# Patient Record
Sex: FEMALE | Race: ASIAN | URBAN - METROPOLITAN AREA
[De-identification: names, ages, dates, MRNs, and addresses within clinical notes are randomized per-mention and may not be internally consistent; named-entity substitution may affect disease eponyms.]

---

## 2018-11-28 ENCOUNTER — EMERGENCY (EMERGENCY)
Facility: HOSPITAL | Age: 43
LOS: 0 days | Discharge: HOME | End: 2018-11-28
Attending: EMERGENCY MEDICINE | Admitting: EMERGENCY MEDICINE

## 2018-11-28 VITALS
RESPIRATION RATE: 20 BRPM | HEIGHT: 66.93 IN | DIASTOLIC BLOOD PRESSURE: 95 MMHG | OXYGEN SATURATION: 97 % | WEIGHT: 139.99 LBS | HEART RATE: 79 BPM | SYSTOLIC BLOOD PRESSURE: 131 MMHG | TEMPERATURE: 98 F
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
## 2018-11-28 DIAGNOSIS — H57.89 OTHER SPECIFIED DISORDERS OF EYE AND ADNEXA: ICD-10-CM

## 2018-11-28 DIAGNOSIS — Z23 ENCOUNTER FOR IMMUNIZATION: ICD-10-CM
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
## 2018-11-28 DIAGNOSIS — S05.02XA INJURY OF CONJUNCTIVA AND CORNEAL ABRASION WITHOUT FOREIGN BODY, LEFT EYE, INITIAL ENCOUNTER: ICD-10-CM
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
## 2018-11-28 RX ORDER — CIPROFLOXACIN HCL 0.3 %
1 DROPS OPHTHALMIC (EYE) ONCE
Qty: 0 | Refills: 0 | Status: COMPLETED | OUTPATIENT
Start: 2018-11-28 | End: 2018-11-28

## 2018-11-28 RX ORDER — TETANUS TOXOID, REDUCED DIPHTHERIA TOXOID AND ACELLULAR PERTUSSIS VACCINE, ADSORBED 5; 2.5; 8; 8; 2.5 [IU]/.5ML; [IU]/.5ML; UG/.5ML; UG/.5ML; UG/.5ML
0.5 SUSPENSION INTRAMUSCULAR ONCE
Qty: 0 | Refills: 0 | Status: COMPLETED | OUTPATIENT
Start: 2018-11-28 | End: 2018-11-28

## 2018-11-28 RX ADMIN — Medication 1 DROP(S): at 21:51

## 2018-11-28 RX ADMIN — TETANUS TOXOID, REDUCED DIPHTHERIA TOXOID AND ACELLULAR PERTUSSIS VACCINE, ADSORBED 0.5 MILLILITER(S): 5; 2.5; 8; 8; 2.5 SUSPENSION INTRAMUSCULAR at 21:52

## 2018-11-28 NOTE — ED PROVIDER NOTE - PHYSICAL EXAMINATION
Physical Exam    Vital Signs: I have reviewed the initial vital signs.  Constitutional: well-nourished, appears stated age, no acute distress  Eyes: Conjunctiva pink, Sclera clear, PERRLA, EOMI. neg sidel + dye uptake to sclear and cornea. vision 20/30LT, 20/30RT, 20/30 both  Integumentary: warm, dry, no rash  Neurologic: awake, alert, cranial nerves II-XII grossly intact, extremities’ motor and sensory functions grossly intact  Psychiatric: appropriate mood, appropriate affect

## 2018-11-28 NOTE — ED ADULT NURSE NOTE - NSIMPLEMENTINTERV_GEN_ALL_ED
Implemented All Universal Safety Interventions:  [place] to call system. Call bell, personal items and telephone within reach. Instruct patient to call for assistance. Room bathroom lighting operational. Non-slip footwear when patient is off stretcher. Physically safe environment: no spills, clutter or unnecessary equipment. Stretcher in lowest position, wheels locked, appropriate side rails in place.

## 2018-11-28 NOTE — ED PROVIDER NOTE - CARE PLAN
Principal Discharge DX:	Corneal abrasion  Goal:	Sclera abrasion Principal Discharge DX:	Corneal abrasion  Goal:	Sclera abrasion  Assessment and plan of treatment:	Plan: Pain control, Flourescein application, Eye drops, reassess.

## 2018-11-28 NOTE — ED PROVIDER NOTE - NS ED ROS FT
Constitutional: (-) fever  Eyes/ENT: (-) blurry vision, (-) epistaxis  Musculoskeletal: (-) neck pain, (-) back pain, (-) joint pain  Integumentary: (-) rash, (-) edema  Neurological: (-) headache, (-) altered mental status

## 2018-11-28 NOTE — ED PROVIDER NOTE - NSFOLLOWUPINSTRUCTIONS_ED_ALL_ED_FT
Follow up with eye doctors in 1-2 days. Place 1 drop in your injured eye every 4 hours while awake for the next 5 days.

## 2018-11-28 NOTE — ED PROVIDER NOTE - NSFOLLOWUPCLINICS_GEN_ALL_ED_FT
[facility]  Ophthalmolgy  [address], [address]  [place] [address]  Phone: [phone]  Fax:   Follow Up Time: 1-3 Days

## 2018-11-28 NOTE — ED PROVIDER NOTE - OBJECTIVE STATEMENT
42 year old female states that an xmas ornament fell into her eye. patient states that since it happened she has been in pain. patient denies trouble seeing, no tearing no discharge. no headache no dizziness.

## 2018-11-28 NOTE — ED PROVIDER NOTE - MEDICAL DECISION MAKING DETAILS
pt and son at bedside aware of corneal abrasions, eye drops given in ed, aware of proper use, signs and symptoms to return for, will follow up with opthalmology as well.

## 2018-11-28 NOTE — ED PROVIDER NOTE - CARE PROVIDER_API CALL
Glynn Hernandez (MD), Ophthalmology  [address] [place] [address]  Phone: [phone]  Fax: [phone]
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
## 2018-11-29 ENCOUNTER — OUTPATIENT (OUTPATIENT)
Dept: OUTPATIENT SERVICES | Facility: HOSPITAL | Age: 43
LOS: 1 days | Discharge: HOME | End: 2018-11-29

## 2018-12-01 PROBLEM — Z00.00 ENCOUNTER FOR PREVENTIVE HEALTH EXAMINATION: Status: ACTIVE | Noted: 2018-12-01

## 2018-12-04 DIAGNOSIS — H11.32 CONJUNCTIVAL HEMORRHAGE, LEFT EYE: ICD-10-CM

## 2018-12-04 DIAGNOSIS — H10.32 UNSPECIFIED ACUTE CONJUNCTIVITIS, LEFT EYE: ICD-10-CM

## 2018-12-04 DIAGNOSIS — H10.45 OTHER CHRONIC ALLERGIC CONJUNCTIVITIS: ICD-10-CM

## 2019-05-11 ENCOUNTER — EMERGENCY (EMERGENCY)
Facility: HOSPITAL | Age: 44
LOS: 0 days | Discharge: HOME | End: 2019-05-11
Attending: EMERGENCY MEDICINE | Admitting: EMERGENCY MEDICINE
Payer: MEDICAID

## 2019-05-11 VITALS
SYSTOLIC BLOOD PRESSURE: 137 MMHG | OXYGEN SATURATION: 99 % | TEMPERATURE: 98 F | HEART RATE: 95 BPM | DIASTOLIC BLOOD PRESSURE: 96 MMHG | RESPIRATION RATE: 18 BRPM

## 2019-05-11 DIAGNOSIS — M79.646 PAIN IN UNSPECIFIED FINGER(S): ICD-10-CM

## 2019-05-11 DIAGNOSIS — S62.664A NONDISPLACED FRACTURE OF DISTAL PHALANX OF RIGHT RING FINGER, INITIAL ENCOUNTER FOR CLOSED FRACTURE: ICD-10-CM
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
## 2019-05-11 DIAGNOSIS — S60.141A CONTUSION OF RIGHT RING FINGER WITH DAMAGE TO NAIL, INITIAL ENCOUNTER: ICD-10-CM
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
## 2019-05-11 PROCEDURE — 26011 DRAINAGE OF FINGER ABSCESS: CPT

## 2019-05-11 PROCEDURE — 26750 TREAT FINGER FRACTURE EACH: CPT | Mod: 54

## 2019-05-11 PROCEDURE — 99284 EMERGENCY DEPT VISIT MOD MDM: CPT | Mod: 25

## 2019-05-11 PROCEDURE — 73130 X-RAY EXAM OF HAND: CPT | Mod: 26,RT

## 2019-05-11 RX ORDER — TETANUS TOXOID, REDUCED DIPHTHERIA TOXOID AND ACELLULAR PERTUSSIS VACCINE, ADSORBED 5; 2.5; 8; 8; 2.5 [IU]/.5ML; [IU]/.5ML; UG/.5ML; UG/.5ML; UG/.5ML
0.5 SUSPENSION INTRAMUSCULAR ONCE
Refills: 0 | Status: COMPLETED | OUTPATIENT
Start: 2019-05-11 | End: 2019-05-11

## 2019-05-11 NOTE — ED ADULT TRIAGE NOTE - CHIEF COMPLAINT QUOTE
pts son translating pt states she got right ring finger stuck in garage door. pt has bleeding and swelling to finger.

## 2019-05-11 NOTE — ED PROCEDURE NOTE - CPROC ED POST PROC CARE GUIDE1
Keep the cast/splint/dressing clean and dry./Verbal/written post procedure instructions were given to patient/caregiver./Instructed patient/caregiver to follow-up with primary care physician.
Instructed patient/caregiver regarding signs and symptoms of infection./Verbal/written post procedure instructions were given to patient/caregiver./Instructed patient/caregiver to follow-up with primary care physician.

## 2019-05-11 NOTE — ED PROVIDER NOTE - ATTENDING CONTRIBUTION TO CARE
42 yo F presents with c/o pain and swelling to right ring finger after catching it in the door. On exam pt in NAD AAO x 3, + swelling with tenderness to right ring finger with subungual hematoma, FROM, + abrasion as well,

## 2019-05-11 NOTE — ED PROVIDER NOTE - PHYSICAL EXAMINATION
Physical Exam    Vital Signs: I have reviewed the initial vital signs.  Constitutional: well-nourished, appears stated age, no acute distress  Cardiovascular: regular rate, regular rhythm, well-perfused extremities, radial pulse +2 b/l, cap refill <2 sec b/l.  Musculoskeletal: +TTP over the distal 4th phalanx digit, full ROM in all other joints of the hand  Integumentary: +puncture R puncture 4th digit volar surface w/subungual hematoma  Neurologic: extremities’ motor and sensory functions grossly intact

## 2019-05-11 NOTE — ED PROVIDER NOTE - CARE PLAN
Principal Discharge DX:	Closed nondisplaced fracture of distal phalanx of ring finger, unspecified laterality, initial encounter

## 2019-05-11 NOTE — ED PROVIDER NOTE - CARE PROVIDER_API CALL
Zaki Mena)  Orthopaedic Surgery  [address] [place] [address]  Phone: [phone]  Fax: [phone]  Follow Up Time:
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
## 2019-05-11 NOTE — ED PROVIDER NOTE - NS ED ROS FT
Review of Systems    Constitutional: (-) fever  Musculoskeletal: (-) neck pain, (-) back pain, (+) joint pain  Integumentary: (-) laceration  Heme/Lymph: (-) easy bruising (-) easy bleeding  Allergic/Immunologic: (-) pruritus

## 2019-05-11 NOTE — ED PROVIDER NOTE - PRINCIPAL DIAGNOSIS
Closed nondisplaced fracture of distal phalanx of ring finger, unspecified laterality, initial encounter

## 2024-05-01 ENCOUNTER — EMERGENCY (EMERGENCY)
Facility: HOSPITAL | Age: 49
LOS: 0 days | Discharge: HOME | End: 2024-05-01
Attending: EMERGENCY MEDICINE
Payer: MEDICAID

## 2024-05-01 VITALS
SYSTOLIC BLOOD PRESSURE: 165 MMHG | RESPIRATION RATE: 20 BRPM | DIASTOLIC BLOOD PRESSURE: 97 MMHG | OXYGEN SATURATION: 96 % | TEMPERATURE: 98 F | WEIGHT: 160.06 LBS | HEART RATE: 88 BPM

## 2024-05-01 DIAGNOSIS — J02.9 ACUTE PHARYNGITIS, UNSPECIFIED: ICD-10-CM

## 2024-05-01 DIAGNOSIS — T71.193A ASPHYXIATION DUE TO MECHANICAL THREAT TO BREATHING DUE TO OTHER CAUSES, ASSAULT, INITIAL ENCOUNTER: ICD-10-CM

## 2024-05-01 DIAGNOSIS — M54.2 CERVICALGIA: ICD-10-CM

## 2024-05-01 DIAGNOSIS — R09.89 OTHER SPECIFIED SYMPTOMS AND SIGNS INVOLVING THE CIRCULATORY AND RESPIRATORY SYSTEMS: ICD-10-CM

## 2024-05-01 DIAGNOSIS — R09.81 NASAL CONGESTION: ICD-10-CM

## 2024-05-01 DIAGNOSIS — R59.0 LOCALIZED ENLARGED LYMPH NODES: ICD-10-CM

## 2024-05-01 LAB
ALBUMIN SERPL ELPH-MCNC: 4.4 G/DL — SIGNIFICANT CHANGE UP (ref 3.5–5.2)
ALP SERPL-CCNC: 93 U/L — SIGNIFICANT CHANGE UP (ref 30–115)
ALT FLD-CCNC: 32 U/L — SIGNIFICANT CHANGE UP (ref 0–41)
ANION GAP SERPL CALC-SCNC: 11 MMOL/L — SIGNIFICANT CHANGE UP (ref 7–14)
AST SERPL-CCNC: 28 U/L — SIGNIFICANT CHANGE UP (ref 0–41)
BILIRUB SERPL-MCNC: <0.2 MG/DL — SIGNIFICANT CHANGE UP (ref 0.2–1.2)
BUN SERPL-MCNC: 16 MG/DL — SIGNIFICANT CHANGE UP (ref 10–20)
CALCIUM SERPL-MCNC: 8.8 MG/DL — SIGNIFICANT CHANGE UP (ref 8.4–10.5)
CHLORIDE SERPL-SCNC: 105 MMOL/L — SIGNIFICANT CHANGE UP (ref 98–110)
CO2 SERPL-SCNC: 24 MMOL/L — SIGNIFICANT CHANGE UP (ref 17–32)
CREAT SERPL-MCNC: 0.6 MG/DL — LOW (ref 0.7–1.5)
EGFR: 110 ML/MIN/1.73M2 — SIGNIFICANT CHANGE UP
GLUCOSE SERPL-MCNC: 136 MG/DL — HIGH (ref 70–99)
HCG SERPL QL: NEGATIVE — SIGNIFICANT CHANGE UP
HCT VFR BLD CALC: 40.9 % — SIGNIFICANT CHANGE UP (ref 37–47)
HGB BLD-MCNC: 13.6 G/DL — SIGNIFICANT CHANGE UP (ref 12–16)
MCHC RBC-ENTMCNC: 30.5 PG — SIGNIFICANT CHANGE UP (ref 27–31)
MCHC RBC-ENTMCNC: 33.3 G/DL — SIGNIFICANT CHANGE UP (ref 32–37)
MCV RBC AUTO: 91.7 FL — SIGNIFICANT CHANGE UP (ref 81–99)
NRBC # BLD: 0 /100 WBCS — SIGNIFICANT CHANGE UP (ref 0–0)
PLATELET # BLD AUTO: 214 K/UL — SIGNIFICANT CHANGE UP (ref 130–400)
PMV BLD: 10 FL — SIGNIFICANT CHANGE UP (ref 7.4–10.4)
POTASSIUM SERPL-MCNC: 4.2 MMOL/L — SIGNIFICANT CHANGE UP (ref 3.5–5)
POTASSIUM SERPL-SCNC: 4.2 MMOL/L — SIGNIFICANT CHANGE UP (ref 3.5–5)
PROT SERPL-MCNC: 7.4 G/DL — SIGNIFICANT CHANGE UP (ref 6–8)
RBC # BLD: 4.46 M/UL — SIGNIFICANT CHANGE UP (ref 4.2–5.4)
RBC # FLD: 12.3 % — SIGNIFICANT CHANGE UP (ref 11.5–14.5)
SODIUM SERPL-SCNC: 140 MMOL/L — SIGNIFICANT CHANGE UP (ref 135–146)
WBC # BLD: 7.12 K/UL — SIGNIFICANT CHANGE UP (ref 4.8–10.8)
WBC # FLD AUTO: 7.12 K/UL — SIGNIFICANT CHANGE UP (ref 4.8–10.8)

## 2024-05-01 PROCEDURE — 36415 COLL VENOUS BLD VENIPUNCTURE: CPT

## 2024-05-01 PROCEDURE — 96374 THER/PROPH/DIAG INJ IV PUSH: CPT | Mod: XU

## 2024-05-01 PROCEDURE — 70491 CT SOFT TISSUE NECK W/DYE: CPT | Mod: MC

## 2024-05-01 PROCEDURE — 85027 COMPLETE CBC AUTOMATED: CPT

## 2024-05-01 PROCEDURE — 99284 EMERGENCY DEPT VISIT MOD MDM: CPT | Mod: 25

## 2024-05-01 PROCEDURE — 80053 COMPREHEN METABOLIC PANEL: CPT

## 2024-05-01 PROCEDURE — 99285 EMERGENCY DEPT VISIT HI MDM: CPT

## 2024-05-01 PROCEDURE — 99053 MED SERV 10PM-8AM 24 HR FAC: CPT

## 2024-05-01 PROCEDURE — 70491 CT SOFT TISSUE NECK W/DYE: CPT | Mod: 26,MC

## 2024-05-01 PROCEDURE — 84703 CHORIONIC GONADOTROPIN ASSAY: CPT

## 2024-05-01 RX ORDER — DEXAMETHASONE 0.5 MG/5ML
10 ELIXIR ORAL ONCE
Refills: 0 | Status: COMPLETED | OUTPATIENT
Start: 2024-05-01 | End: 2024-05-01

## 2024-05-01 RX ADMIN — Medication 102 MILLIGRAM(S): at 05:46

## 2024-05-01 NOTE — ED ADULT NURSE NOTE - NSFALLUNIVINTERV_ED_ALL_ED
Bed/Stretcher in lowest position, wheels locked, appropriate side rails in place/Call bell, personal items and telephone in reach/Instruct patient to call for assistance before getting out of bed/chair/stretcher/Non-slip footwear applied when patient is off stretcher/[place] to call system/Physically safe environment - no spills, clutter or unnecessary equipment/Purposeful proactive rounding/Room/bathroom lighting operational, light cord in reach
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
## 2024-05-01 NOTE — ED PROVIDER NOTE - PROGRESS NOTE DETAILS
Pt does not want  Ed to call [facility].  Pt  wants to go home.  says she will stay in another room and lock the door.

## 2024-05-01 NOTE — ED POST DISCHARGE NOTE - RESULT SUMMARY
CT NECK- ENLARGED LYMPH NODES, NEEDS ENT F/U. SPOKE WITH PATIENT AND DAUGHTER AND ADVISED F/U WITH PMD AND ENT THIS WEEK

## 2024-05-01 NOTE — ED PROVIDER NOTE - PHYSICAL EXAMINATION
Physical Exam    Vital Signs: I have reviewed the initial vital signs.  Constitutional: well-nourished, appears stated age, no acute distress  Eyes: Conjunctiva pink, Sclera clear, PERRLA, EOMI.  Neck: no swelling mild right sided tenderness, no tenderness over carotid, no carotid bruis, trachea midline with clear air entry.  Throat: no redness no swelling no exudates.   Cardiovascular: S1 and S2, regular rate, regular rhythm, well-perfused extremities, radial pulses equal and 2+  Respiratory: unlabored respiratory effort, clear to auscultation bilaterally no wheezing, rales and rhonchi  Gastrointestinal: soft, non-tender abdomen, no pulsatile mass, normal bowl sounds  Musculoskeletal: supple neck, no lower extremity edema, no midline tenderness  Integumentary: warm, dry, no rash  Neurologic: awake, alert, cranial nerves II-XII grossly intact, extremities’ motor and sensory functions grossly intact  Psychiatric: appropriate mood, appropriate affect
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
## 2024-05-01 NOTE — ED PROVIDER NOTE - PATIENT PORTAL LINK FT
You can access the FollowMyHealth Patient Portal offered by [facility] by registering at the following website: [email or web address]. By joining [employer]’s FollowMyHealth portal, you will also be able to view your health information using other applications (apps) compatible with our system.
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
## 2024-05-01 NOTE — ED ADULT NURSE NOTE - HIV OFFER
Duration Of Freeze Thaw-Cycle (Seconds): 10 Number Of Freeze-Thaw Cycles: 1 freeze-thaw cycle Render Note In Bullet Format When Appropriate: No Detail Level: Simple Consent: The patient's consent was obtained including but not limited to risks of crusting, scabbing, blistering, scarring, darker or lighter pigmentary change, recurrence, incomplete removal and infection. Post-Care Instructions: I reviewed with the patient in detail post-care instructions. Patient is to wear sunprotection, and avoid picking at any of the treated lesions. Pt may apply Vaseline to crusted or scabbing areas. Opt out

## 2024-05-01 NOTE — ED PROVIDER NOTE - NSFOLLOWUPINSTRUCTIONS_ED_ALL_ED_FT
you must follow up with your doctor for ultrasound of your thyroid nodule found on cat scan today     strangulation happens when something wraps so tightly around your neck that it causes harm.    Injuries may include:    Cuts in the skin around the neck.  Damage to your voice box or windpipe.  Damage to the main arteries in the neck.  Brain damage.  Your doctor can provide resources for getting help if your injury was caused by domestic abuse, depression, or other mental health issues.    It may not be safe to take home information like this handout if your injury was a result of domestic abuse. Some people ask a trusted friend to keep it for them. It's also important to plan ahead and to memorize the phone numbers of places you can go for help. If you are concerned about your safety, do not use your computer, smartphone, or tablet to read about domestic abuse.    Follow-up care is a key part of your treatment and safety. Be sure to make and go to all appointments, and call your doctor or nurse advice line (811 in most provinces and territories) if you are having problems. It's also a good idea to know your test results and keep a list of the medicines you take.    How can you care for yourself at home?  Put ice or a cold pack on the area for 10 to 20 minutes at a time. Put a thin cloth between the ice and your skin.  Ask your doctor if you can take an over-the-counter pain medicine, such as acetaminophen (Tylenol), ibuprofen (Advil, Motrin), or naproxen (Aleve). Be safe with medicines. Read and follow all instructions on the label.  If your doctor told you how to care for any cuts on your neck, follow your doctor's instructions. If you did not get instructions, follow this general advice:  Wash the cut with clean water 2 times a day. Don't use hydrogen peroxide or alcohol, which can slow healing.  You may cover it with a thin layer of petroleum jelly, such as Vaseline, and a non-stick bandage.  Apply more petroleum jelly and replace the bandage as needed.  When should you call for help?  	  Call 911 anytime you think you may need emergency care. For example, call if:    You passed out (lost consciousness).  You have a seizure.  You have symptoms of a brain injury, such as:  Changes in thinking.  Changes in movement or feeling.  You think that you or someone you know is in danger of being abused.  You feel you cannot stop from hurting yourself.  Call your doctor or nurse advice line now or seek immediate medical care if:    You have new or worse trouble breathing.  You have new or worse trouble swallowing.  You cough up blood.  You have new or increased weakness or numbness in your arms.  Watch closely for changes in your health, and be sure to contact your doctor or nurse advice line if:    You have problems with depression or other mental health issues.  You do not get better as expected.      [place] RESOURCES FOR VICTIMS OF DOMESTIC VIOLENCE  HOTLINES  ? [facility] Domestic Violence Hotline: 311 or (822) 336-HOPE (5732) / TTY: (659) 770-9957  ? [facility] Domestic Violence Hotline: (306) 150-9733 / TTY: 711  ? National Domestic Violence Hotline:(149) 363-SAFE (4850) / TTY: 819-3873  ? Crime Victims Hotline/Crisis Support Line ([facility]): (901) 575-HELP (1239)/ (605) 804-6729  ? [facility] Immigration Hotline: (821) 467-9277  ? LifeNet (Mental Health [facility]): (739) LIFENET (152-5697)  ? Victim Information and Notification Everyday (VINE) Program: (140) VINE-4-[place] (928-7963)  ? [facility] Office of Victim Services (OVS): (746) 158-9650  ? [facility] Sex Crimes Report Hotline: (597) 267-RAPE (4664)  ? Youthline: (519) 468-1207  ? Rape, Sexual Assault, and Incest Hotline ([facility] [place]): (683) 771-8811  ? Child Abuse and Maltreatment Hotline ([facility] [place]): (399) 236-4681  ? Prevent Child Abuse [place]: (924) 471-1820  SAFETY RESOURCES  ? Alternatives to Shelter: (477) 476-4709 – home safety alarm systems  ? Project SAFE: (536) 030-HELP (4158) – door lock change  COUNSELING and ADVOCACY RESOURCES  ? Anti-Violence Project: (815) 545-3791; located in [place]; satellite office - [address] 3rd Fl.  ? [place] [facility] Family Support Center: (779) 405-7978; located in [facility]  ? [facility] HomeBase: (162) 593-2084; [address]  ? [employer]ities [facility] [facility] [place]: (420) 678-7073; [address]  ? Center for Independent Living: (928) 400-5029; [address]  ? Children’s Aid Society: (793) 120-8807; [address]  ? Community Agency for Senior Citizens (CASC): (925) 781-2106; [address]  ? Day One: (862) 164-3566; located in [place]  ? [place]: (990) 688-1964; [address]  ? [facility] of Family and Children Services (FCS): (234) 825-2148; [address]  ? [facility] for Interpersonal Development (NYCID): (193) 683-2683; [address] [facility] 5th Fl.  ? [place] Foundling: (443) 104-3630; [address]  ? Project Hospitality: (896) 375-7168; [address]  ? [facility]Care Workforce Services: (735) 669-6245; [address] [facility]; 4th Fl.  ? [facility] Community Program & Rape Crisis Center: (914) 269-5212; [facility], 5th Fl.  ? [place] for Families: (212) 349-6009 x221; located in [place]  ? [facility] for  Women: (334) 840-2193; [address] [facility].  ? [place]’s Society for Children and Families: (744) 510-2214; [address] .  ? [place]’s Services: (233) 489-7070; [address] .  ? [place] [facility]: (843) 691-9142; [address] 3rd Fl[address]  ? [place] Mental Health Society (SIMHS): (942) 908-1753; [address]  ? STEPS to End Family Violence: (666) 859-8615; main service location is [place]  ? [facility]: (778) 323-4192; [address] [facility].  LEGAL SERVICES  ? Anti-Violence Project: (969) 840-6523; located in [place]; satellite office - [address] 3rd [address]  ? [facility] Eviction Prevention: (351) 624-4254; [address] [facility]  ? [facility] Justice Center (Pro se matrimonial legal assistance): (776) 104-3085; located in [place]  ? Day One: (314) 682-7911; located in [place]  ? inMotion: (390) 181-6131; offices in [facility] and the [place]  ?  Society - [facility] Office: (748) 993-8006; [address] [facility]  ? [place] Legal Assistance Group (NYLAG): (801) 609-7162; located in [place]  ? [facility]  Referral Line: (347) 634-8240; [address]  ? [facility] [place] Domestic Violence Law Project: (718) 834-7430 x10; located in [facility]  ? [place] Immigration Law Project: (771) 834-3468; located in [facility]  ? [place] for Families Center for Battered Women’s Legal Services: (825) 593-4840; located in [place]  ? SHIELD (Self-Help Information Education and Legal Defense) Hotline: (133) 347-2207 /(675) 814-7680  ? [place] Legal Services: (615) 522-2395; [address] [facility] DOMESTIC VIOLENCE RESPONSE TEAM (“DVRT”)  [email or web address]  ? [place] Youth Justice Center: (442) 934-4423; [address] 4th [address].  ? STEPS to End Family Violence: (130) 789-6288; main service location is [place]  ? [facility] Domestic Violence Project: (281) 936-2121; located in [facility]  CRIMINAL JUSTICE/ LAW ENFORCEMENT/COURT RESOURCES  ? [facility] , Domestic Violence Case Coordinator: (381) 439-6866  ? [facility] Civil Court: (167) 183-3125; [address]  ? [facility] Criminal Court: (607) 441-7849; [address] [facility] [address]  ? [facility] Family Court: (670) 730-6437; [address] [facility].  ? [facility] Criminal Court Program: (884) 638-1482; [address]  ? [facility] Family and IDV Court Program: (309) 477-4979; [address] [facility], 5th Fl.  [facility] PRECINC  Each precinct has at least one Domestic Violence Prevention Officer (DVPO).  [address] (824) 746-8837; DVPO ([phone]  [address] (799) 928-1733; DVPO ([phone] [phone]  [address] (629) 216-4528; DVPO [phone]  MEDICAL  Public Health Clinics  ? [facility]: (432) 139-1341; [address]  ? [facility]: (773) 691-4330;  [address]  ? [facility]: (774) 860-6466; [address]  ? [facility] Health Connection Mobile Medical Office: (189) 976-7620  Private Hospitals  ? [facility]:   [facility]: (707) 547-4824; [address] • [facility]: (442) 976-6196; [address]  ? [facility]:   [place] (228) 912-1582; [address] • [place] (870) 535-6235; [address]  Other medical services  ? [place] [facility] Need Services (first time pregnant women): (827) 151-3877; [address] [facility], 5th Fl.  ? [facility] [facility] (home attendant/housekeeping/nursing): (601) 866-3492; [address]  ? [facility]: (937) 446-8047; [address]  COLLEGE SERVICES  ? [facility], Women’s Center: (525) 188-7454; [address]  ? [facility] Center: (647) 856-1304; [facility]  ? [facility] for Health & Wellness: (961) 937-8885; [facility]  SERVICES for VICTIMS of ELDER ABUSE  ? Adult Protective Services ([facility] Human Resources Administration): (770) 251-2089  ? Community Agency for Senior Citizens: (588) 470-8382; [address] [facility]  ? Elderly Crime Victims Resource Center, [facility] Dept. for the Aging: (524) 337-3662  CITY/ STATE AGENCIES AND OFFICES  ? [facility]’s Office to Combat Domestic Violence & [place] DVRT: (585) 919-7634  ? [facility] Administration for Children’s Services: (200) 274-2047  ? [facility] Department for the [place] or (482) [place] (548-7035)  ? [facility] Department of Human Resources Administration: (578) 300-1875  ? [facility] Department of Correction: (505) 357-5361  ? [facility] Department of Probation: (917) 615-7788  ? [facility] Department of Homeless Services: (778) 497-8761  ? [facility] Department of [facility] [employer] [facility] [employer]: (458) 583-4806  ? [facility] Housing Authority: (118) 934-5261

## 2024-05-01 NOTE — ED PROVIDER NOTE - ED STEMI HIDDEN
DC Plan: Discharge home with MD follow up, family assistance once medically stable    Chart reviewed. Pt admitted to tele by hospitalist.  Noted pt covid + per provider notes. Called and spoke with pt on phone regarding dc plan. CM role explained. Pt independent. Pt has friend/family support at time of discharge. Pt has ride home. Pt denies using [facility] or [facility]. Pts confirms PCP as Cira Mccann, last saw beg of the year. Pt states she also saw a urologist at one point thru [facility], name unknown. No dc concerns identified. CM will cont to follow for transition of care needs [phone]    Care Management Interventions  PCP Verified by CM: Yes  Mode of Transport at Discharge:  Other (see comment)(FRIEND OR FAMILY)  Transition of Care Consult (CM Consult): Discharge Planning  Current Support Network: Lives with Spouse  Discharge Location  Discharge Placement: Home with family assistance [place]
